# Patient Record
Sex: FEMALE | Race: WHITE | NOT HISPANIC OR LATINO | Employment: STUDENT | URBAN - METROPOLITAN AREA
[De-identification: names, ages, dates, MRNs, and addresses within clinical notes are randomized per-mention and may not be internally consistent; named-entity substitution may affect disease eponyms.]

---

## 2019-01-14 ENCOUNTER — OFFICE VISIT (OUTPATIENT)
Dept: URGENT CARE | Facility: CLINIC | Age: 24
End: 2019-01-14
Payer: COMMERCIAL

## 2019-01-14 VITALS
HEIGHT: 67 IN | SYSTOLIC BLOOD PRESSURE: 96 MMHG | DIASTOLIC BLOOD PRESSURE: 68 MMHG | RESPIRATION RATE: 16 BRPM | WEIGHT: 159 LBS | HEART RATE: 91 BPM | BODY MASS INDEX: 24.96 KG/M2 | TEMPERATURE: 98.4 F | OXYGEN SATURATION: 100 %

## 2019-01-14 DIAGNOSIS — J02.0 PHARYNGITIS DUE TO STREPTOCOCCUS SPECIES: ICD-10-CM

## 2019-01-14 DIAGNOSIS — J03.90 TONSILLITIS: ICD-10-CM

## 2019-01-14 LAB
INT CON NEG: ABNORMAL
INT CON POS: ABNORMAL
S PYO AG THROAT QL: POSITIVE

## 2019-01-14 PROCEDURE — 99214 OFFICE O/P EST MOD 30 MIN: CPT | Performed by: NURSE PRACTITIONER

## 2019-01-14 PROCEDURE — 87880 STREP A ASSAY W/OPTIC: CPT | Performed by: NURSE PRACTITIONER

## 2019-01-14 RX ORDER — AZITHROMYCIN 250 MG/1
TABLET, FILM COATED ORAL
Qty: 6 TAB | Refills: 0 | Status: SHIPPED | OUTPATIENT
Start: 2019-01-14 | End: 2019-01-25

## 2019-01-14 RX ORDER — DROSPIRENONE, ETHINYL ESTRADIOL AND LEVOMEFOLATE CALCIUM AND LEVOMEFOLATE CALCIUM 3-0.02(24)
KIT ORAL
COMMUNITY
Start: 2018-11-02

## 2019-01-14 RX ORDER — DEXAMETHASONE SODIUM PHOSPHATE 10 MG/ML
10 INJECTION INTRAMUSCULAR; INTRAVENOUS ONCE
Status: COMPLETED | OUTPATIENT
Start: 2019-01-14 | End: 2019-01-14

## 2019-01-14 RX ADMIN — DEXAMETHASONE SODIUM PHOSPHATE 10 MG: 10 INJECTION INTRAMUSCULAR; INTRAVENOUS at 11:20

## 2019-01-14 ASSESSMENT — ENCOUNTER SYMPTOMS
FEVER: 0
VOMITING: 0
NAUSEA: 0
DIZZINESS: 0
HOARSE VOICE: 0
SHORTNESS OF BREATH: 0
SWOLLEN GLANDS: 1
TROUBLE SWALLOWING: 1
SORE THROAT: 1
CHILLS: 0
MYALGIAS: 0
EYE PAIN: 0

## 2019-01-14 NOTE — LETTER
January 14, 2019         Patient: Asia Kruger   YOB: 1995   Date of Visit: 1/14/2019           To Whom it May Concern:    Asia Kruger was seen in my clinic on 1/14/2019. She may return to work on 1/16/19.    If you have any questions or concerns, please don't hesitate to call.        Sincerely,           JESSICA Layne.  Electronically Signed

## 2019-01-14 NOTE — PROGRESS NOTES
"Subjective:   April Saskia is a 23 y.o. female who presents for Sore Throat (C/o swollen tonsils, sore throat, poss fever x1 day)        Pharyngitis    This is a new problem. The current episode started yesterday. The problem has been gradually worsening. Neither side of throat is experiencing more pain than the other. The maximum temperature recorded prior to her arrival was 100.4 - 100.9 F. The fever has been present for less than 1 day. The pain is at a severity of 10/10. The pain is severe. Associated symptoms include swollen glands and trouble swallowing. Pertinent negatives include no congestion, ear discharge, ear pain, hoarse voice, plugged ear sensation, shortness of breath or vomiting. She has had no exposure to strep. She has tried acetaminophen and gargles for the symptoms. The treatment provided no relief.     Review of Systems   Constitutional: Negative for chills and fever.   HENT: Positive for sore throat and trouble swallowing. Negative for congestion, ear discharge, ear pain and hoarse voice.    Eyes: Negative for pain.   Respiratory: Negative for shortness of breath.    Cardiovascular: Negative for chest pain.   Gastrointestinal: Negative for nausea and vomiting.   Genitourinary: Negative for hematuria.   Musculoskeletal: Negative for myalgias.   Skin: Negative for rash.   Neurological: Negative for dizziness.     Allergies   Allergen Reactions   • Latex Rash     Rash on hands.   • Pcn [Penicillins]       Objective:   BP (!) 96/68   Pulse 91   Temp 36.9 °C (98.4 °F) (Temporal)   Resp 16   Ht 1.702 m (5' 7\")   Wt 72.1 kg (159 lb)   SpO2 100%   BMI 24.90 kg/m²   Physical Exam   Constitutional: She is oriented to person, place, and time. She appears well-developed and well-nourished. No distress.   HENT:   Head: Normocephalic and atraumatic.   Right Ear: Tympanic membrane normal.   Left Ear: Tympanic membrane normal.   Nose: Nose normal. Right sinus exhibits no maxillary sinus tenderness " and no frontal sinus tenderness. Left sinus exhibits no maxillary sinus tenderness and no frontal sinus tenderness.   Mouth/Throat: Uvula is midline and mucous membranes are normal. No uvula swelling. Posterior oropharyngeal edema and posterior oropharyngeal erythema present. No tonsillar abscesses. Tonsils are 2+ on the right. Tonsils are 2+ on the left. No tonsillar exudate.   Eyes: Pupils are equal, round, and reactive to light. Conjunctivae and EOM are normal. Right eye exhibits no discharge. Left eye exhibits no discharge.   Cardiovascular: Normal rate and regular rhythm.    No murmur heard.  Pulmonary/Chest: Effort normal and breath sounds normal. No respiratory distress.   Abdominal: Soft. She exhibits no distension. There is no tenderness.   Lymphadenopathy:     She has cervical adenopathy.   Neurological: She is alert and oriented to person, place, and time. She has normal reflexes. No sensory deficit.   Skin: Skin is warm, dry and intact.   Psychiatric: She has a normal mood and affect.         Assessment/Plan:     1. Tonsillitis  dexamethasone (DECADRON) injection (check route below) 10 mg    POCT Rapid Strep A    azithromycin (ZITHROMAX) 250 MG Tab   2. Pharyngitis due to Streptococcus species  azithromycin (ZITHROMAX) 250 MG Tab     Strep positive  Advised to continue supportive care with Tylenol and/or ibuprofen for fevers and discomfort. Increased fluids and electrolytes.  Letter provided for work     Patient given precautionary s/sx that mandate immediate follow up and evaluation in the ED. Advised of risks of not doing so.    DDX, Supportive care, and indications for immediate follow-up discussed with patient.    Instructed to return to clinic or nearest emergency department if we are not available for any change in condition, further concerns, or worsening of symptoms.    The patient demonstrated a good understanding and agreed with the treatment plan.

## 2019-01-25 ENCOUNTER — OFFICE VISIT (OUTPATIENT)
Dept: URGENT CARE | Facility: CLINIC | Age: 24
End: 2019-01-25
Payer: COMMERCIAL

## 2019-01-25 VITALS
BODY MASS INDEX: 24.96 KG/M2 | HEIGHT: 67 IN | WEIGHT: 159 LBS | OXYGEN SATURATION: 98 % | SYSTOLIC BLOOD PRESSURE: 110 MMHG | HEART RATE: 76 BPM | DIASTOLIC BLOOD PRESSURE: 72 MMHG | RESPIRATION RATE: 16 BRPM | TEMPERATURE: 98.6 F

## 2019-01-25 DIAGNOSIS — T78.40XA ALLERGIC REACTION TO DRUG, INITIAL ENCOUNTER: ICD-10-CM

## 2019-01-25 PROCEDURE — 99213 OFFICE O/P EST LOW 20 MIN: CPT | Performed by: FAMILY MEDICINE

## 2019-01-25 ASSESSMENT — ENCOUNTER SYMPTOMS
FEVER: 0
COUGH: 0
ABDOMINAL PAIN: 0
DIARRHEA: 0
VOMITING: 0
SORE THROAT: 1
NAUSEA: 0
SHORTNESS OF BREATH: 0
HEADACHES: 0

## 2019-01-25 NOTE — PROGRESS NOTES
Subjective:     Asia Kruger is a 23 y.o. female who presents for Rash (1 day )    HPI  Pt presents for evaluation of a new problem   Patient is a 23-year-old female with rash for the past 24 hours  Patient recently had strep and was treated with Bactrim due to penicillin allergy  Patient was taking medicine for about 6 days and then noticed a rash starting on her legs  Stopped taking Bactrim but rash progressing over the last 24 hours  Rash has now become severe, painful, and all over her face, chest, and worse in the legs  Rash is constantly present and Benadryl is not helping  Denies any swelling in throat, shortness of breath, or wheezing  Denies any fevers  No blistering of rash    Review of Systems   Constitutional: Negative for fever.   HENT: Positive for sore throat. Negative for congestion.    Respiratory: Negative for cough and shortness of breath.    Cardiovascular: Negative for chest pain.   Gastrointestinal: Negative for abdominal pain, diarrhea, nausea and vomiting.   Skin: Positive for rash.   Neurological: Negative for headaches.       PMH:  has a past medical history of ASTHMA; Otitis media; Sinusitis; Skin complaints; and Tonsillitis (5/30/2012).  MEDS:   Current Outpatient Prescriptions:   •  Drospiren-Eth Estrad-Levomefol 3-0.02-0.451 MG Tab, , Disp: , Rfl:   •  Pseudoephedrine-APAP-DM (DAYQUIL PO), Take  by mouth., Disp: , Rfl:   •  azithromycin (ZITHROMAX) 250 MG Tab, Take 2 tablets by mouth on day one. Take one tablet by mouth the remaining days until gone, Disp: 6 Tab, Rfl: 0  ALLERGIES:   Allergies   Allergen Reactions   • Latex Rash     Rash on hands.   • Pcn [Penicillins]      SURGHX: No past surgical history on file.  SOCHX:  reports that she has never smoked. She has never used smokeless tobacco. She reports that she does not drink alcohol or use drugs.  FH: Family history was reviewed, not contributing to acute illness     Objective:   /72   Pulse 76   Temp 37 °C (98.6 °F)  "(Temporal)   Resp 16   Ht 1.702 m (5' 7\")   Wt 72.1 kg (159 lb)   SpO2 98%   BMI 24.90 kg/m²      Physical Exam   Constitutional: She appears well-developed and well-nourished. No distress.   HENT:   Head: Normocephalic and atraumatic.   Tonsils 3+ with no posterior pharynx erythema or exudate   Eyes: Conjunctivae and EOM are normal. Right eye exhibits no discharge. Left eye exhibits no discharge. No scleral icterus.   Neck: Normal range of motion. No tracheal deviation present.   Cardiovascular: Normal rate and regular rhythm.    Pulmonary/Chest: Effort normal and breath sounds normal. No respiratory distress. She has no wheezes. She has no rales.   Neurological: She is alert. Coordination normal.   Skin: Skin is warm and dry. She is not diaphoretic.   Diffuse erythematous urticarial rash which is blanchable, tender, and worse over the legs   Psychiatric: She has a normal mood and affect. Her behavior is normal. Judgment and thought content normal.       Assessment/Plan:   Assessment    1. Allergic reaction to drug, initial encounter  Patient is a 23-year-old female withAllergic reaction to Bactrim most likely.  Rash is diffuse and becoming painful.  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  Discussed risks and benefits of outpatient workup and treatment with steroids versus being seen in the ER and patient would feel much more comfortable being seen in the ER.  She has no shortness of breath or difficulty swallowing,.  Patient's mother will drive her to [facility] as this is where their insurance is covered.      "

## 2024-04-10 ENCOUNTER — ANCILLARY PROCEDURE (OUTPATIENT)
Dept: MATERNAL FETAL MEDICINE | Facility: MEDICAL CENTER | Age: 29
End: 2024-04-10
Attending: OBSTETRICS & GYNECOLOGY
Payer: COMMERCIAL

## 2024-04-10 VITALS
HEART RATE: 78 BPM | BODY MASS INDEX: 28.71 KG/M2 | DIASTOLIC BLOOD PRESSURE: 78 MMHG | WEIGHT: 183.3 LBS | SYSTOLIC BLOOD PRESSURE: 138 MMHG

## 2024-04-10 DIAGNOSIS — O30.041 DICHORIONIC DIAMNIOTIC TWIN PREGNANCY IN FIRST TRIMESTER: ICD-10-CM

## 2024-04-10 PROCEDURE — 76801 OB US < 14 WKS SINGLE FETUS: CPT | Performed by: OBSTETRICS & GYNECOLOGY

## 2024-04-10 PROCEDURE — 76802 OB US < 14 WKS ADDL FETUS: CPT | Performed by: OBSTETRICS & GYNECOLOGY

## 2024-05-02 ENCOUNTER — APPOINTMENT (OUTPATIENT)
Dept: MATERNAL FETAL MEDICINE | Facility: MEDICAL CENTER | Age: 29
End: 2024-05-02
Attending: OBSTETRICS & GYNECOLOGY
Payer: COMMERCIAL

## 2024-05-02 VITALS
DIASTOLIC BLOOD PRESSURE: 81 MMHG | SYSTOLIC BLOOD PRESSURE: 123 MMHG | WEIGHT: 190.3 LBS | BODY MASS INDEX: 29.81 KG/M2 | HEART RATE: 86 BPM

## 2024-05-02 DIAGNOSIS — O30.049 DICHORIONIC DIAMNIOTIC TWIN PREGNANCY, ANTEPARTUM: ICD-10-CM

## 2024-05-02 PROCEDURE — 76817 TRANSVAGINAL US OBSTETRIC: CPT | Performed by: OBSTETRICS & GYNECOLOGY
